# Patient Record
Sex: MALE | Employment: UNEMPLOYED | URBAN - METROPOLITAN AREA
[De-identification: names, ages, dates, MRNs, and addresses within clinical notes are randomized per-mention and may not be internally consistent; named-entity substitution may affect disease eponyms.]

---

## 2018-09-05 ENCOUNTER — TELEMEDICINE2 (OUTPATIENT)
Dept: ENDOCRINOLOGY | Facility: MEDICAL CENTER | Age: 23
End: 2018-09-05
Payer: COMMERCIAL

## 2018-09-05 VITALS
HEIGHT: 70 IN | SYSTOLIC BLOOD PRESSURE: 110 MMHG | OXYGEN SATURATION: 98 % | HEART RATE: 87 BPM | DIASTOLIC BLOOD PRESSURE: 70 MMHG | WEIGHT: 148 LBS | BODY MASS INDEX: 21.19 KG/M2

## 2018-09-05 DIAGNOSIS — F64.9 GENDER IDENTITY DISORDER: ICD-10-CM

## 2018-09-05 DIAGNOSIS — Z79.899 HIGH RISK MEDICATION USE: ICD-10-CM

## 2018-09-05 PROCEDURE — 99214 OFFICE O/P EST MOD 30 MIN: CPT | Performed by: INTERNAL MEDICINE

## 2018-09-05 RX ORDER — ESTRADIOL 2 MG/1
2 TABLET ORAL DAILY
COMMUNITY

## 2018-09-10 NOTE — PROGRESS NOTES
"Endocrinology Clinic Progress Note  PCP: No primary care provider on file.    HPI:  Osiris Larose is a 23 y.o. old patient who comes in today for review of endocrine problems.  Gender identity disorder (male to female): She has transformed beautifully on 2 mg of estrogen along with Spironolactone 100 mg twice daily.  She just wanted to make sure that she is getting the right treatment.    ROS:  Constitutional: No unintentional weight loss  Endo: Denies excessive thirst or frequent urination  All other systems were reviewed and were negative.    Past Medical History:  There are no active problems to display for this patient.      Medications:    Current Outpatient Prescriptions:   •  estradiol (ESTRACE) 2 MG Tab, Take 2 mg by mouth every day., Disp: , Rfl:     Labs: Reviewed    Physical Examination:  Vital signs: /70   Pulse 87   Ht 1.778 m (5' 10\")   Wt 67.1 kg (148 lb)   SpO2 98%   BMI 21.24 kg/m²  Body mass index is 21.24 kg/m².  General: No apparent distress, cooperative,thin  Eyes: No scleral icterus, no discharge, normal eyelids  Neck: No abnormal masses on inspection, normal thyroid exam  Resp: Normal effort, clear to auscultation bilaterally  CVS: Regular rate and rhythm, S1 S2 normal, no murmur  Extremities: No lower extremity edema  Abdomen: no abdominal obesity present  Musculoskeletal: Normal digits and nails  Skin: No rash on visible skin  Psych: Alert and oriented, normal mood and affect, intact memory and able to make informed decisions.    Assessment and Plan:    1. Gender identity disorder  Recommend to continue 2 mg estradiol along with Spironolactone 100 mg twice daily.  Recommend to check liver enzymes and potassium because of the use of this to medications    2. High risk medication use  Considering estrogen and Spironolactone use recommend to check liver enzymes and potassium levels.    This consultation was conducted utilizing secure and encrypted videoconferencing equipment with the " assistance of a trained tele-presenter at the originating site.    Return in about 3 months (around 12/5/2018).    Thank you for allowing me to participate in the care of this patient.    Thai Bravo M.D.    CC:   No primary care provider on file.    This note was created using voice recognition software (Dragon). The accuracy of the dictation is limited by the abilities of the software. I have reviewed the note prior to signing, however some errors in grammar and context are still possible. If you have any questions related to this note please do not hesitate to contact our office.